# Patient Record
Sex: FEMALE | Race: WHITE | NOT HISPANIC OR LATINO | Employment: FULL TIME | URBAN - METROPOLITAN AREA
[De-identification: names, ages, dates, MRNs, and addresses within clinical notes are randomized per-mention and may not be internally consistent; named-entity substitution may affect disease eponyms.]

---

## 2023-03-27 ENCOUNTER — TELEPHONE (OUTPATIENT)
Dept: PRIMARY CARE | Facility: CLINIC | Age: 58
End: 2023-03-27
Payer: COMMERCIAL

## 2023-03-27 DIAGNOSIS — K21.9 CHRONIC GERD: ICD-10-CM

## 2023-03-27 DIAGNOSIS — I10 HYPERTENSION, UNSPECIFIED TYPE: ICD-10-CM

## 2023-03-27 RX ORDER — OMEPRAZOLE 20 MG/1
20 CAPSULE, DELAYED RELEASE ORAL
Qty: 90 CAPSULE | Refills: 3 | Status: SHIPPED | OUTPATIENT
Start: 2023-03-27 | End: 2023-11-28

## 2023-03-27 RX ORDER — OMEPRAZOLE 20 MG/1
20 CAPSULE, DELAYED RELEASE ORAL
COMMUNITY
Start: 2022-03-08 | End: 2023-03-27 | Stop reason: SDUPTHER

## 2023-03-27 RX ORDER — LOSARTAN POTASSIUM AND HYDROCHLOROTHIAZIDE 12.5; 5 MG/1; MG/1
1 TABLET ORAL DAILY
Qty: 90 TABLET | Refills: 3 | Status: SHIPPED | OUTPATIENT
Start: 2023-03-27 | End: 2023-11-28

## 2023-03-27 RX ORDER — LOSARTAN POTASSIUM AND HYDROCHLOROTHIAZIDE 12.5; 5 MG/1; MG/1
1 TABLET ORAL DAILY
COMMUNITY
Start: 2022-11-02 | End: 2023-03-27 | Stop reason: SDUPTHER

## 2023-05-24 ENCOUNTER — APPOINTMENT (OUTPATIENT)
Dept: PRIMARY CARE | Facility: CLINIC | Age: 58
End: 2023-05-24
Payer: COMMERCIAL

## 2023-06-02 PROBLEM — S52.123A RADIAL HEAD FRACTURE: Status: ACTIVE | Noted: 2023-06-02

## 2023-06-02 PROBLEM — K21.9 GERD (GASTROESOPHAGEAL REFLUX DISEASE): Status: ACTIVE | Noted: 2023-06-02

## 2023-06-02 PROBLEM — R13.10 DYSPHAGIA: Status: ACTIVE | Noted: 2023-06-02

## 2023-06-02 PROBLEM — E66.9 OBESITY: Status: ACTIVE | Noted: 2023-06-02

## 2023-06-02 PROBLEM — J30.2 SEASONAL ALLERGIES: Status: ACTIVE | Noted: 2023-06-02

## 2023-06-02 PROBLEM — I10 PRIMARY HYPERTENSION: Status: ACTIVE | Noted: 2023-06-02

## 2023-06-02 PROBLEM — M62.830 PARASPINAL MUSCLE SPASM: Status: ACTIVE | Noted: 2023-06-02

## 2023-06-02 PROBLEM — M17.10 ARTHRITIS OF KNEE: Status: ACTIVE | Noted: 2023-06-02

## 2023-06-02 PROBLEM — E04.1 THYROID NODULE: Status: ACTIVE | Noted: 2023-06-02

## 2023-06-02 PROBLEM — E55.9 VITAMIN D DEFICIENCY DISEASE: Status: ACTIVE | Noted: 2023-06-02

## 2023-06-02 PROBLEM — R10.30 LOWER ABDOMINAL PAIN: Status: ACTIVE | Noted: 2023-06-02

## 2023-06-03 ASSESSMENT — ENCOUNTER SYMPTOMS
ORTHOPNEA: 0
NECK PAIN: 0
BLURRED VISION: 0
SWEATS: 0
SHORTNESS OF BREATH: 0
PND: 0
HEADACHES: 0
PALPITATIONS: 0
HYPERTENSION: 1

## 2023-06-07 ENCOUNTER — OFFICE VISIT (OUTPATIENT)
Dept: PRIMARY CARE | Facility: CLINIC | Age: 58
End: 2023-06-07
Payer: COMMERCIAL

## 2023-06-07 VITALS
OXYGEN SATURATION: 99 % | TEMPERATURE: 98.4 F | HEIGHT: 66 IN | SYSTOLIC BLOOD PRESSURE: 132 MMHG | HEART RATE: 76 BPM | RESPIRATION RATE: 18 BRPM | BODY MASS INDEX: 36.8 KG/M2 | DIASTOLIC BLOOD PRESSURE: 90 MMHG | WEIGHT: 229 LBS

## 2023-06-07 DIAGNOSIS — E11.9 TYPE 2 DIABETES MELLITUS WITHOUT COMPLICATION, WITHOUT LONG-TERM CURRENT USE OF INSULIN (MULTI): Primary | ICD-10-CM

## 2023-06-07 DIAGNOSIS — E66.01 CLASS 2 SEVERE OBESITY DUE TO EXCESS CALORIES WITH SERIOUS COMORBIDITY AND BODY MASS INDEX (BMI) OF 36.0 TO 36.9 IN ADULT (MULTI): ICD-10-CM

## 2023-06-07 DIAGNOSIS — I10 PRIMARY HYPERTENSION: ICD-10-CM

## 2023-06-07 PROBLEM — R13.10 DYSPHAGIA: Status: RESOLVED | Noted: 2023-06-02 | Resolved: 2023-06-07

## 2023-06-07 PROBLEM — R10.30 LOWER ABDOMINAL PAIN: Status: RESOLVED | Noted: 2023-06-02 | Resolved: 2023-06-07

## 2023-06-07 PROBLEM — S52.123A RADIAL HEAD FRACTURE: Status: RESOLVED | Noted: 2023-06-02 | Resolved: 2023-06-07

## 2023-06-07 PROBLEM — E55.9 VITAMIN D DEFICIENCY DISEASE: Status: RESOLVED | Noted: 2023-06-02 | Resolved: 2023-06-07

## 2023-06-07 PROBLEM — M62.830 PARASPINAL MUSCLE SPASM: Status: RESOLVED | Noted: 2023-06-02 | Resolved: 2023-06-07

## 2023-06-07 PROBLEM — E04.1 THYROID NODULE: Status: RESOLVED | Noted: 2023-06-02 | Resolved: 2023-06-07

## 2023-06-07 LAB — POC HEMOGLOBIN A1C: 6.6 % (ref 4.2–6.5)

## 2023-06-07 PROCEDURE — 3075F SYST BP GE 130 - 139MM HG: CPT | Performed by: INTERNAL MEDICINE

## 2023-06-07 PROCEDURE — 1036F TOBACCO NON-USER: CPT | Performed by: INTERNAL MEDICINE

## 2023-06-07 PROCEDURE — 3080F DIAST BP >= 90 MM HG: CPT | Performed by: INTERNAL MEDICINE

## 2023-06-07 PROCEDURE — 99214 OFFICE O/P EST MOD 30 MIN: CPT | Performed by: INTERNAL MEDICINE

## 2023-06-07 PROCEDURE — 3008F BODY MASS INDEX DOCD: CPT | Performed by: INTERNAL MEDICINE

## 2023-06-07 PROCEDURE — 83036 HEMOGLOBIN GLYCOSYLATED A1C: CPT | Performed by: INTERNAL MEDICINE

## 2023-06-07 ASSESSMENT — ENCOUNTER SYMPTOMS
ORTHOPNEA: 0
SWEATS: 0
PND: 0
BLURRED VISION: 0
HYPERTENSION: 1
NECK PAIN: 0
HEADACHES: 0
PALPITATIONS: 0
SHORTNESS OF BREATH: 0

## 2023-06-07 NOTE — PROGRESS NOTES
"Sabino Haywood is a 58 y.o. female who presents for Hypertension.  Hypertension  This is a chronic problem. The current episode started more than 1 year ago. The problem has been rapidly improving since onset. The problem is controlled. Pertinent negatives include no anxiety, blurred vision, chest pain, headaches, malaise/fatigue, neck pain, orthopnea, palpitations, peripheral edema, PND, shortness of breath or sweats. Agents associated with hypertension include NSAIDs. Risk factors for coronary artery disease include family history, obesity and post-menopausal state. There are no compliance problems.    She is doing well. The elbow  is healing up well.   No chest pain or leg swelling.   Her blood sugar was a little high last time but it was not fasting.   She  has a family of diabetes.  Mother is type 2    Review of Systems   Constitutional:  Negative for malaise/fatigue.   Eyes:  Negative for blurred vision.   Respiratory:  Negative for shortness of breath.    Cardiovascular:  Negative for chest pain, palpitations, orthopnea and PND.   Musculoskeletal:  Negative for neck pain.   Neurological:  Negative for headaches.   All other systems reviewed and are negative.        Objective     /90   Pulse 76   Temp 36.9 °C (98.4 °F)   Resp 18   Ht 1.676 m (5' 6\")   Wt 104 kg (229 lb)   SpO2 99%   BMI 36.96 kg/m²    Physical Exam  Vitals reviewed.   Constitutional:       General: She is not in acute distress.     Appearance: Normal appearance.   Cardiovascular:      Rate and Rhythm: Normal rate and regular rhythm.      Pulses: Normal pulses.      Heart sounds: Normal heart sounds.   Pulmonary:      Effort: Pulmonary effort is normal.      Breath sounds: Normal breath sounds.   Abdominal:      Tenderness: There is no abdominal tenderness.   Musculoskeletal:         General: No swelling.   Skin:     General: Skin is warm and dry.   Neurological:      Mental Status: She is alert.       Health " Maintenance Due   Topic Date Due    Yearly Adult Physical  Never done    HIV Screening  Never done    MMR Vaccines (1 of 1 - Standard series) Never done    HIB Vaccines (1 of 1 - Risk 1-dose series) Never done    Meningococcal Vaccine (1 - Risk 2-dose series) Never done    Meningococcal B Vaccine (1 of 4 - Increased Risk Bexsero 2-dose series) Never done    Hepatitis C Screening  Never done    Diabetes Screening  Never done    Zoster Vaccines (1 of 2) Never done    Cervical Cancer Screening  Never done    Pneumococcal Vaccine: Pediatrics (0 to 5 Years) and At-Risk Patients (6 to 64 Years) (2 - PPSV23 if available, else PCV20) 09/20/2019    COVID-19 Vaccine (5 - Booster) 10/21/2022          Assessment/Plan   Problem List Items Addressed This Visit          Circulatory    Primary hypertension       Endocrine/Metabolic    Obesity     Other Visit Diagnoses       Type 2 diabetes mellitus without complication, without long-term current use of insulin (CMS/formerly [facility])    -  Primary    Relevant Medications    semaglutide 0.25 mg or 0.5 mg (2 mg/3 mL) pen injector    Other Relevant Orders    POCT glycosylated hemoglobin (Hb A1C) manually resulted (Completed)        Stable based on symptoms. Continue established treatment plan and follow up at least yearly

## 2023-06-07 NOTE — PATIENT INSTRUCTIONS
Call  with any problems or questions.   Discussed risk benefit and side effects of glp  Follow up in 3 months.

## 2023-09-11 ENCOUNTER — APPOINTMENT (OUTPATIENT)
Dept: PRIMARY CARE | Facility: CLINIC | Age: 58
End: 2023-09-11
Payer: COMMERCIAL

## 2023-09-14 ENCOUNTER — OFFICE VISIT (OUTPATIENT)
Dept: PRIMARY CARE | Facility: CLINIC | Age: 58
End: 2023-09-14
Payer: COMMERCIAL

## 2023-09-14 VITALS
DIASTOLIC BLOOD PRESSURE: 82 MMHG | RESPIRATION RATE: 14 BRPM | WEIGHT: 204 LBS | TEMPERATURE: 97 F | OXYGEN SATURATION: 96 % | HEART RATE: 78 BPM | SYSTOLIC BLOOD PRESSURE: 120 MMHG | HEIGHT: 66 IN | BODY MASS INDEX: 32.78 KG/M2

## 2023-09-14 DIAGNOSIS — Z00.00 WELLNESS EXAMINATION: Primary | ICD-10-CM

## 2023-09-14 DIAGNOSIS — Z23 ENCOUNTER FOR ADMINISTRATION OF VACCINE: ICD-10-CM

## 2023-09-14 DIAGNOSIS — I10 PRIMARY HYPERTENSION: ICD-10-CM

## 2023-09-14 DIAGNOSIS — Z90.81 ACQUIRED ASPLENIA: ICD-10-CM

## 2023-09-14 DIAGNOSIS — Z12.31 ENCOUNTER FOR SCREENING MAMMOGRAM FOR MALIGNANT NEOPLASM OF BREAST: ICD-10-CM

## 2023-09-14 DIAGNOSIS — E11.9 TYPE 2 DIABETES MELLITUS WITHOUT COMPLICATION, WITHOUT LONG-TERM CURRENT USE OF INSULIN (MULTI): ICD-10-CM

## 2023-09-14 LAB — POC HEMOGLOBIN A1C: 6.1 % (ref 4.2–6.5)

## 2023-09-14 PROCEDURE — 90471 IMMUNIZATION ADMIN: CPT | Performed by: INTERNAL MEDICINE

## 2023-09-14 PROCEDURE — 3074F SYST BP LT 130 MM HG: CPT | Performed by: INTERNAL MEDICINE

## 2023-09-14 PROCEDURE — 1036F TOBACCO NON-USER: CPT | Performed by: INTERNAL MEDICINE

## 2023-09-14 PROCEDURE — 3079F DIAST BP 80-89 MM HG: CPT | Performed by: INTERNAL MEDICINE

## 2023-09-14 PROCEDURE — 3008F BODY MASS INDEX DOCD: CPT | Performed by: INTERNAL MEDICINE

## 2023-09-14 PROCEDURE — 99396 PREV VISIT EST AGE 40-64: CPT | Performed by: INTERNAL MEDICINE

## 2023-09-14 PROCEDURE — 90686 IIV4 VACC NO PRSV 0.5 ML IM: CPT | Performed by: INTERNAL MEDICINE

## 2023-09-14 PROCEDURE — 83036 HEMOGLOBIN GLYCOSYLATED A1C: CPT | Performed by: INTERNAL MEDICINE

## 2023-09-14 RX ORDER — CHOLECALCIFEROL (VITAMIN D3) 25 MCG
50 TABLET ORAL DAILY
COMMUNITY
Start: 2022-03-08

## 2023-09-14 NOTE — PROGRESS NOTES
"Subjective    Yudelka Haywood is a 58 y.o. female who presents for Hypertension.  HPI  3 month follow up HTN and physical  She is feeling good.   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 She has lost 25 lbs. Portion control less carb and more protein. And walking.   Bp has been good at home.   Denies headaches, dizziness, chest pain  Would like flu vaccine.       Review of Systems   All other systems reviewed and are negative.        Objective     /82 (BP Location: Left arm, Patient Position: Sitting, BP Cuff Size: Adult)   Pulse 78   Temp 36.1 °C (97 °F) (Temporal)   Resp 14   Ht 1.676 m (5' 6\")   Wt 92.5 kg (204 lb)   SpO2 96%   BMI 32.93 kg/m²    Physical Exam  Constitutional:       Appearance: Normal appearance.   Cardiovascular:      Rate and Rhythm: Normal rate and regular rhythm.      Pulses: Normal pulses.   Pulmonary:      Effort: Pulmonary effort is normal.      Breath sounds: Normal breath sounds.   Chest:      Chest wall: No mass or tenderness.   Breasts:     Right: Normal.      Left: Normal.   Abdominal:      General: Abdomen is flat.   Musculoskeletal:      Cervical back: Neck supple. No tenderness.   Lymphadenopathy:      Cervical: No cervical adenopathy.      Upper Body:      Right upper body: No supraclavicular or axillary adenopathy.      Left upper body: No supraclavicular or axillary adenopathy.   Neurological:      Mental Status: She is alert.   Psychiatric:         Attention and Perception: Attention and perception normal.         Mood and Affect: Mood and affect normal.       Health Maintenance Due   Topic Date Due    HIV Screening  Never done    HIB Vaccines (1 of 1 - Risk 1-dose series) Never done    Meningococcal Vaccine (1 - Risk 2-dose series) Never done    Diabetes: Foot Exam  Never done    Diabetes: Retinopathy Screening  Never done    Meningococcal B Vaccine (1 of 4 - Increased Risk) Never done    Hepatitis C " Screening  Never done    Diabetes: Urine Protein Screening  Never done    Cervical Cancer Screening  Never done    MMR Vaccines (1 of 1 - Standard series) Never done    Pneumococcal Vaccine: Pediatrics (0 to 5 Years) and At-Risk Patients (6 to 64 Years) (2 - PPSV23 or PCV20) 09/20/2019    COVID-19 Vaccine (5 - Moderna series) 01/16/2023    Lipid Panel  03/17/2023          Assessment/Plan   Problem List Items Addressed This Visit       Primary hypertension    Type 2 diabetes mellitus without complication, without long-term current use of insulin (CMS/[facility])    Relevant Orders    POCT glycosylated hemoglobin (Hb A1C) manually resulted (Completed)     Other Visit Diagnoses       Wellness examination    -  Primary    Relevant Orders    CBC    Comprehensive Metabolic Panel    Lipid Panel    Encounter for administration of vaccine        Relevant Orders    Flu vaccine (IIV4) age 6 months and greater, preservative free (Completed)    Encounter for screening mammogram for malignant neoplasm of breast        Relevant Orders    BI mammo bilateral screening tomosynthesis    Acquired asplenia        she  is not up to date with her vaccines.   will look up her old records        Patient was identified as a fall risk. Risk prevention instructions provided.  Check labs mamm  Cont with dietary changes.   Recheck hg A1c in three

## 2023-09-18 ENCOUNTER — LAB (OUTPATIENT)
Dept: LAB | Facility: LAB | Age: 58
End: 2023-09-18
Payer: COMMERCIAL

## 2023-09-18 DIAGNOSIS — Z00.00 WELLNESS EXAMINATION: ICD-10-CM

## 2023-09-18 LAB
ALANINE AMINOTRANSFERASE (SGPT) (U/L) IN SER/PLAS: 17 U/L (ref 7–45)
ALBUMIN (G/DL) IN SER/PLAS: 4.1 G/DL (ref 3.4–5)
ALKALINE PHOSPHATASE (U/L) IN SER/PLAS: 70 U/L (ref 33–136)
ANION GAP IN SER/PLAS: 13 MMOL/L (ref 10–20)
ASPARTATE AMINOTRANSFERASE (SGOT) (U/L) IN SER/PLAS: 16 U/L (ref 9–39)
BILIRUBIN TOTAL (MG/DL) IN SER/PLAS: 0.6 MG/DL (ref 0–1.2)
CALCIUM (MG/DL) IN SER/PLAS: 9.6 MG/DL (ref 8.6–10.3)
CARBON DIOXIDE, TOTAL (MMOL/L) IN SER/PLAS: 28 MMOL/L (ref 21–32)
CHLORIDE (MMOL/L) IN SER/PLAS: 102 MMOL/L (ref 98–107)
CHOLESTEROL (MG/DL) IN SER/PLAS: 171 MG/DL (ref 0–199)
CHOLESTEROL IN HDL (MG/DL) IN SER/PLAS: 58.7 MG/DL
CHOLESTEROL/HDL RATIO: 2.9
CREATININE (MG/DL) IN SER/PLAS: 0.66 MG/DL (ref 0.5–1.05)
ERYTHROCYTE DISTRIBUTION WIDTH (RATIO) BY AUTOMATED COUNT: 13.8 % (ref 11.5–14.5)
ERYTHROCYTE MEAN CORPUSCULAR HEMOGLOBIN CONCENTRATION (G/DL) BY AUTOMATED: 32.4 G/DL (ref 32–36)
ERYTHROCYTE MEAN CORPUSCULAR VOLUME (FL) BY AUTOMATED COUNT: 89 FL (ref 80–100)
ERYTHROCYTES (10*6/UL) IN BLOOD BY AUTOMATED COUNT: 4.73 X10E12/L (ref 4–5.2)
GFR FEMALE: >90 ML/MIN/1.73M2
GLUCOSE (MG/DL) IN SER/PLAS: 98 MG/DL (ref 74–99)
HEMATOCRIT (%) IN BLOOD BY AUTOMATED COUNT: 42 % (ref 36–46)
HEMOGLOBIN (G/DL) IN BLOOD: 13.6 G/DL (ref 12–16)
LDL: 95 MG/DL (ref 0–99)
LEUKOCYTES (10*3/UL) IN BLOOD BY AUTOMATED COUNT: 7.2 X10E9/L (ref 4.4–11.3)
PLATELETS (10*3/UL) IN BLOOD AUTOMATED COUNT: 307 X10E9/L (ref 150–450)
POTASSIUM (MMOL/L) IN SER/PLAS: 3.8 MMOL/L (ref 3.5–5.3)
PROTEIN TOTAL: 7.2 G/DL (ref 6.4–8.2)
SODIUM (MMOL/L) IN SER/PLAS: 139 MMOL/L (ref 136–145)
TRIGLYCERIDE (MG/DL) IN SER/PLAS: 88 MG/DL (ref 0–149)
UREA NITROGEN (MG/DL) IN SER/PLAS: 11 MG/DL (ref 6–23)
VLDL: 18 MG/DL (ref 0–40)

## 2023-09-18 PROCEDURE — 85027 COMPLETE CBC AUTOMATED: CPT

## 2023-09-18 PROCEDURE — 80053 COMPREHEN METABOLIC PANEL: CPT

## 2023-09-18 PROCEDURE — 36415 COLL VENOUS BLD VENIPUNCTURE: CPT

## 2023-09-18 PROCEDURE — 80061 LIPID PANEL: CPT

## 2023-11-28 DIAGNOSIS — I10 HYPERTENSION, UNSPECIFIED TYPE: ICD-10-CM

## 2023-11-28 DIAGNOSIS — K21.9 CHRONIC GERD: ICD-10-CM

## 2023-11-28 RX ORDER — OMEPRAZOLE 20 MG/1
20 CAPSULE, DELAYED RELEASE ORAL
Qty: 90 CAPSULE | Refills: 3 | Status: SHIPPED | OUTPATIENT
Start: 2023-11-28 | End: 2024-01-23 | Stop reason: SDUPTHER

## 2023-11-28 RX ORDER — LOSARTAN POTASSIUM AND HYDROCHLOROTHIAZIDE 12.5; 5 MG/1; MG/1
1 TABLET ORAL DAILY
Qty: 90 TABLET | Refills: 3 | Status: SHIPPED | OUTPATIENT
Start: 2023-11-28 | End: 2024-01-23 | Stop reason: SDUPTHER

## 2023-12-11 ENCOUNTER — ANCILLARY PROCEDURE (OUTPATIENT)
Dept: RADIOLOGY | Facility: CLINIC | Age: 58
End: 2023-12-11
Payer: COMMERCIAL

## 2023-12-11 DIAGNOSIS — Z12.31 ENCOUNTER FOR SCREENING MAMMOGRAM FOR MALIGNANT NEOPLASM OF BREAST: ICD-10-CM

## 2023-12-11 DIAGNOSIS — E11.9 TYPE 2 DIABETES MELLITUS WITHOUT COMPLICATION, WITHOUT LONG-TERM CURRENT USE OF INSULIN (MULTI): Primary | ICD-10-CM

## 2023-12-11 PROCEDURE — 77067 SCR MAMMO BI INCL CAD: CPT | Mod: BILATERAL PROCEDURE | Performed by: RADIOLOGY

## 2023-12-11 PROCEDURE — 77063 BREAST TOMOSYNTHESIS BI: CPT

## 2023-12-11 PROCEDURE — 77063 BREAST TOMOSYNTHESIS BI: CPT | Mod: BILATERAL PROCEDURE | Performed by: RADIOLOGY

## 2023-12-13 ENCOUNTER — LAB (OUTPATIENT)
Dept: LAB | Facility: LAB | Age: 58
End: 2023-12-13
Payer: COMMERCIAL

## 2023-12-13 DIAGNOSIS — E11.9 TYPE 2 DIABETES MELLITUS WITHOUT COMPLICATION, WITHOUT LONG-TERM CURRENT USE OF INSULIN (MULTI): ICD-10-CM

## 2023-12-13 LAB
EST. AVERAGE GLUCOSE BLD GHB EST-MCNC: 128 MG/DL
HBA1C MFR BLD: 6.1 %

## 2023-12-13 PROCEDURE — 83036 HEMOGLOBIN GLYCOSYLATED A1C: CPT

## 2023-12-13 PROCEDURE — 36415 COLL VENOUS BLD VENIPUNCTURE: CPT

## 2024-01-23 DIAGNOSIS — K21.9 CHRONIC GERD: ICD-10-CM

## 2024-01-23 DIAGNOSIS — I10 HYPERTENSION, UNSPECIFIED TYPE: ICD-10-CM

## 2024-01-23 RX ORDER — LOSARTAN POTASSIUM AND HYDROCHLOROTHIAZIDE 12.5; 5 MG/1; MG/1
1 TABLET ORAL DAILY
Qty: 90 TABLET | Refills: 0 | Status: SHIPPED | OUTPATIENT
Start: 2024-01-23 | End: 2024-05-28 | Stop reason: SDUPTHER

## 2024-01-23 RX ORDER — OMEPRAZOLE 20 MG/1
20 CAPSULE, DELAYED RELEASE ORAL
Qty: 90 CAPSULE | Refills: 0 | Status: SHIPPED | OUTPATIENT
Start: 2024-01-23 | End: 2024-05-28 | Stop reason: SDUPTHER

## 2024-01-23 NOTE — TELEPHONE ENCOUNTER
----- Message from Yudelka Haywood sent at 1/22/2024  7:17 PM EST -----  Regarding: Short term prescription   Contact: [phone]  [place]! My  and I traveled to [place] to help my mom for a weekend but he had a cardiac event and we will be here longer. I brought a few extra meds but not enough till he’s cleared to fly (estimate is one week). Are you able to call in a prescription for my BP med and my omeprazole to the [facility] in [place]? The phone number is [phone]. Address is [address] [place] [address] [address] . [place] [phone]. I am concerned if insurance will be a problem if they aren’t due for refill yet but hoping we can work through that. I can be reached at [phone]. Thank you.

## 2024-03-21 ENCOUNTER — OFFICE VISIT (OUTPATIENT)
Dept: PRIMARY CARE | Facility: CLINIC | Age: 59
End: 2024-03-21
Payer: COMMERCIAL

## 2024-03-21 VITALS
WEIGHT: 189 LBS | HEART RATE: 76 BPM | HEIGHT: 66 IN | TEMPERATURE: 97.3 F | OXYGEN SATURATION: 96 % | DIASTOLIC BLOOD PRESSURE: 80 MMHG | SYSTOLIC BLOOD PRESSURE: 132 MMHG | BODY MASS INDEX: 30.37 KG/M2 | RESPIRATION RATE: 14 BRPM

## 2024-03-21 DIAGNOSIS — E11.9 TYPE 2 DIABETES MELLITUS WITHOUT COMPLICATION, WITHOUT LONG-TERM CURRENT USE OF INSULIN (MULTI): Primary | ICD-10-CM

## 2024-03-21 DIAGNOSIS — I10 PRIMARY HYPERTENSION: ICD-10-CM

## 2024-03-21 PROBLEM — E66.9 OBESITY: Status: RESOLVED | Noted: 2023-06-02 | Resolved: 2024-03-21

## 2024-03-21 LAB — POC HEMOGLOBIN A1C: 6.3 % (ref 4.2–6.5)

## 2024-03-21 PROCEDURE — 3075F SYST BP GE 130 - 139MM HG: CPT | Performed by: INTERNAL MEDICINE

## 2024-03-21 PROCEDURE — 3008F BODY MASS INDEX DOCD: CPT | Performed by: INTERNAL MEDICINE

## 2024-03-21 PROCEDURE — 3079F DIAST BP 80-89 MM HG: CPT | Performed by: INTERNAL MEDICINE

## 2024-03-21 PROCEDURE — 1036F TOBACCO NON-USER: CPT | Performed by: INTERNAL MEDICINE

## 2024-03-21 PROCEDURE — 83036 HEMOGLOBIN GLYCOSYLATED A1C: CPT | Performed by: INTERNAL MEDICINE

## 2024-03-21 PROCEDURE — 99214 OFFICE O/P EST MOD 30 MIN: CPT | Performed by: INTERNAL MEDICINE

## 2024-03-21 ASSESSMENT — PATIENT HEALTH QUESTIONNAIRE - PHQ9
SUM OF ALL RESPONSES TO PHQ9 QUESTIONS 1 AND 2: 0
2. FEELING DOWN, DEPRESSED OR HOPELESS: NOT AT ALL
1. LITTLE INTEREST OR PLEASURE IN DOING THINGS: NOT AT ALL

## 2024-03-21 NOTE — PROGRESS NOTES
"poctSubjective    Yudelka Haywood is a 59 y.o. female who presents for Hypertension.  HPI  6 month follow up  Doing well  Last A1c 6.1.   Some stress at home.   She has lost 40# with diet. Elevated proteins less carbs.   She has been walking a lot . Increased fluids  She has had a lot of vacations she has had a lot of stress  and her diet has been different    Review of Systems   All other systems reviewed and are negative.        Objective     /80 (BP Location: Left arm, Patient Position: Sitting, BP Cuff Size: Adult)   Pulse 76   Temp 36.3 °C (97.3 °F) (Skin)   Resp 14   Ht 1.676 m (5' 6\")   Wt 85.7 kg (189 lb)   SpO2 96%   BMI 30.51 kg/m²    Physical Exam  Vitals reviewed.   Constitutional:       General: She is not in acute distress.     Appearance: Normal appearance.   Cardiovascular:      Rate and Rhythm: Normal rate and regular rhythm.      Pulses: Normal pulses.      Heart sounds: Normal heart sounds.   Pulmonary:      Effort: Pulmonary effort is normal.      Breath sounds: Normal breath sounds.   Abdominal:      Tenderness: There is no abdominal tenderness.   Musculoskeletal:         General: No swelling.   Skin:     General: Skin is warm and dry.   Neurological:      Mental Status: She is alert.       Health Maintenance Due   Topic Date Due    HIV Screening  Never done    HIB Vaccines (1 of 1 - Risk 1-dose series) Never done    Meningococcal Vaccine (1 - Risk 2-dose series) Never done    Diabetes: Foot Exam  Never done    Diabetes: Retinopathy Screening  Never done    Meningococcal B Vaccine (1 of 4 - Increased Risk) Never done    Hepatitis C Screening  Never done    Diabetes: Urine Protein Screening  Never done    MMR Vaccines (1 of 1 - Standard series) Never done    Pneumococcal Vaccine: Pediatrics (0 to 5 Years) and At-Risk Patients (6 to 64 Years) (2 - PPSV23 or PCV20) 09/20/2019    Cervical Cancer Screening  06/29/2020          Assessment/Plan   Problem List Items Addressed This Visit       " Primary hypertension    Type 2 diabetes mellitus without complication, without long-term current use of insulin (CMS/[facility]) - Primary    Relevant Orders    POCT glycosylated hemoglobin (Hb A1C) manually resulted (Completed)   Her blood sugars are good.   Her bp is well controlled.   We weight is down.   Cont with dietary changes.  Watch sugars and processed carbs  Call  with any problems or questions.   Follow up  in 6 months.

## 2024-05-07 DIAGNOSIS — M54.9 BACK PAIN, UNSPECIFIED BACK LOCATION, UNSPECIFIED BACK PAIN LATERALITY, UNSPECIFIED CHRONICITY: Primary | ICD-10-CM

## 2024-05-07 RX ORDER — METHYLPREDNISOLONE 4 MG/1
TABLET ORAL
Qty: 21 TABLET | Refills: 0 | Status: SHIPPED | OUTPATIENT
Start: 2024-05-07 | End: 2024-05-14

## 2024-05-07 RX ORDER — CYCLOBENZAPRINE HCL 10 MG
10 TABLET ORAL NIGHTLY PRN
Qty: 30 TABLET | Refills: 0 | Status: SHIPPED | OUTPATIENT
Start: 2024-05-07 | End: 2024-07-06

## 2024-05-28 DIAGNOSIS — I10 HYPERTENSION, UNSPECIFIED TYPE: ICD-10-CM

## 2024-05-28 DIAGNOSIS — K21.9 CHRONIC GERD: ICD-10-CM

## 2024-05-28 RX ORDER — OMEPRAZOLE 20 MG/1
20 CAPSULE, DELAYED RELEASE ORAL
Qty: 90 CAPSULE | Refills: 3 | Status: SHIPPED | OUTPATIENT
Start: 2024-05-28

## 2024-05-28 RX ORDER — LOSARTAN POTASSIUM AND HYDROCHLOROTHIAZIDE 12.5; 5 MG/1; MG/1
1 TABLET ORAL DAILY
Qty: 90 TABLET | Refills: 3 | Status: SHIPPED | OUTPATIENT
Start: 2024-05-28

## 2024-05-28 NOTE — TELEPHONE ENCOUNTER
----- Message from Yudelka Haywood sent at 5/27/2024  9:56 PM EDT -----  Regarding: Rx refills through [facility]   Contact: [phone]  Hello...can you please contact [facility] mail order pharmacy for new prescriptions for my omeprazole and losartan/hctz. I believe the number is [phone]. Thank you.

## 2024-09-18 ASSESSMENT — PROMIS GLOBAL HEALTH SCALE
RATE_SOCIAL_SATISFACTION: VERY GOOD
RATE_AVERAGE_PAIN: 1
RATE_QUALITY_OF_LIFE: VERY GOOD
RATE_PHYSICAL_HEALTH: GOOD
CARRYOUT_PHYSICAL_ACTIVITIES: COMPLETELY
CARRYOUT_SOCIAL_ACTIVITIES: EXCELLENT
RATE_AVERAGE_FATIGUE: MILD
EMOTIONAL_PROBLEMS: RARELY
RATE_GENERAL_HEALTH: VERY GOOD
RATE_MENTAL_HEALTH: VERY GOOD

## 2024-09-25 ENCOUNTER — APPOINTMENT (OUTPATIENT)
Dept: PRIMARY CARE | Facility: CLINIC | Age: 59
End: 2024-09-25
Payer: COMMERCIAL

## 2024-09-25 VITALS
HEIGHT: 66 IN | TEMPERATURE: 97.6 F | SYSTOLIC BLOOD PRESSURE: 132 MMHG | BODY MASS INDEX: 31.66 KG/M2 | DIASTOLIC BLOOD PRESSURE: 80 MMHG | WEIGHT: 197 LBS | RESPIRATION RATE: 18 BRPM | HEART RATE: 68 BPM | OXYGEN SATURATION: 98 %

## 2024-09-25 DIAGNOSIS — R73.9 HYPERGLYCEMIA: ICD-10-CM

## 2024-09-25 DIAGNOSIS — Z12.31 ENCOUNTER FOR SCREENING MAMMOGRAM FOR MALIGNANT NEOPLASM OF BREAST: ICD-10-CM

## 2024-09-25 DIAGNOSIS — Z00.00 WELLNESS EXAMINATION: Primary | ICD-10-CM

## 2024-09-25 DIAGNOSIS — Z23 PNEUMOCOCCAL VACCINATION ADMINISTERED AT CURRENT VISIT: ICD-10-CM

## 2024-09-25 DIAGNOSIS — Z90.81 ACQUIRED ASPLENIA: ICD-10-CM

## 2024-09-25 PROBLEM — E11.9 TYPE 2 DIABETES MELLITUS WITHOUT COMPLICATION, WITHOUT LONG-TERM CURRENT USE OF INSULIN (MULTI): Status: RESOLVED | Noted: 2023-09-14 | Resolved: 2024-09-25

## 2024-09-25 LAB — POC HEMOGLOBIN A1C: 6.2 % (ref 4.2–6.5)

## 2024-09-25 PROCEDURE — 1036F TOBACCO NON-USER: CPT | Performed by: INTERNAL MEDICINE

## 2024-09-25 PROCEDURE — 99396 PREV VISIT EST AGE 40-64: CPT | Performed by: INTERNAL MEDICINE

## 2024-09-25 PROCEDURE — 83036 HEMOGLOBIN GLYCOSYLATED A1C: CPT | Performed by: INTERNAL MEDICINE

## 2024-09-25 PROCEDURE — 3075F SYST BP GE 130 - 139MM HG: CPT | Performed by: INTERNAL MEDICINE

## 2024-09-25 PROCEDURE — 90471 IMMUNIZATION ADMIN: CPT | Performed by: INTERNAL MEDICINE

## 2024-09-25 PROCEDURE — 90677 PCV20 VACCINE IM: CPT | Performed by: INTERNAL MEDICINE

## 2024-09-25 PROCEDURE — 3008F BODY MASS INDEX DOCD: CPT | Performed by: INTERNAL MEDICINE

## 2024-09-25 PROCEDURE — 3079F DIAST BP 80-89 MM HG: CPT | Performed by: INTERNAL MEDICINE

## 2024-09-25 NOTE — PROGRESS NOTES
"Subjective   Patient ID: Yudelka Haywood is a 59 y.o. female who presents for Annual Exam and Diabetes.    Patient reported health Good  Regular Dental Visits yes  Regular Eye Visits yes  Hearing Loss no  Balanced Diet yes  Weight Concerns yes  Exercise Regular  She is going to [place] next week.  Going to [place]  Had her vaccines done flu, rsv, and covid  Granddaughter turned 3 months. Junior Perez. She is babysitting two days a week.  She has changed her diet.  Has lost weight  She works two days aga week   She has not received  a booster for pneumonia or meningococcus vaccine due to asplenia  Sp hyster  Repeat colonoscopy in 2027        Diabetes  She presents for her follow-up diabetic visit. She has type 2 diabetes mellitus. There are no hypoglycemic associated symptoms. Symptoms are stable.        Review of Systems   All other systems reviewed and are negative.      Objective   /80   Pulse 68   Temp 36.4 °C (97.6 °F)   Resp 18   Ht 1.676 m (5' 6\")   Wt 89.4 kg (197 lb)   SpO2 98%   BMI 31.80 kg/m²     Physical Exam  Constitutional:       Appearance: Normal appearance.   Cardiovascular:      Rate and Rhythm: Normal rate and regular rhythm.      Pulses: Normal pulses.   Pulmonary:      Effort: Pulmonary effort is normal.      Breath sounds: Normal breath sounds.   Chest:      Chest wall: No mass or tenderness.   Breasts:     Right: Normal.      Left: Normal.   Abdominal:      General: Abdomen is flat.   Musculoskeletal:      Cervical back: Neck supple. No tenderness.   Lymphadenopathy:      Cervical: No cervical adenopathy.      Upper Body:      Right upper body: No supraclavicular or axillary adenopathy.      Left upper body: No supraclavicular or axillary adenopathy.   Neurological:      Mental Status: She is alert.   Psychiatric:         Attention and Perception: Attention and perception normal.         Mood and Affect: Mood and affect normal.         Assessment/Plan   Problem List Items Addressed " This Visit             ICD-10-CM    RESOLVED: Type 2 diabetes mellitus without complication, without long-term current use of insulin (Multi) E11.9     Other Visit Diagnoses         Codes    Wellness examination    -  Primary Z00.00    Relevant Orders    CBC    Comprehensive Metabolic Panel    Lipid Panel    Pneumococcal vaccination administered at current visit     Z23    Relevant Orders    Pneumococcal conjugate vaccine, 20-valent (PREVNAR 20) (Completed)    Acquired asplenia     Z90.81    Encounter for screening mammogram for malignant neoplasm of breast     Z12.31    Relevant Orders    BI mammo bilateral screening tomosynthesis          Will remove the dx of Dm she has mild hyperglycemia. Improved since weight loss  Needs pneumonia shot every 5 years  Call  with any problems or questions.   Follow up in a 6 months.

## 2024-09-26 ENCOUNTER — LAB (OUTPATIENT)
Dept: LAB | Facility: LAB | Age: 59
End: 2024-09-26
Payer: COMMERCIAL

## 2024-09-26 DIAGNOSIS — Z00.00 WELLNESS EXAMINATION: ICD-10-CM

## 2024-09-26 DIAGNOSIS — R73.9 HYPERGLYCEMIA: ICD-10-CM

## 2024-09-26 LAB
ALBUMIN SERPL BCP-MCNC: 4.1 G/DL (ref 3.4–5)
ALP SERPL-CCNC: 86 U/L (ref 33–136)
ALT SERPL W P-5'-P-CCNC: 12 U/L (ref 7–45)
ANION GAP SERPL CALC-SCNC: 11 MMOL/L (ref 10–20)
AST SERPL W P-5'-P-CCNC: 12 U/L (ref 9–39)
BILIRUB SERPL-MCNC: 0.8 MG/DL (ref 0–1.2)
BUN SERPL-MCNC: 13 MG/DL (ref 6–23)
CALCIUM SERPL-MCNC: 9.4 MG/DL (ref 8.6–10.3)
CHLORIDE SERPL-SCNC: 102 MMOL/L (ref 98–107)
CHOLEST SERPL-MCNC: 175 MG/DL (ref 0–199)
CHOLESTEROL/HDL RATIO: 2.7
CO2 SERPL-SCNC: 30 MMOL/L (ref 21–32)
CREAT SERPL-MCNC: 0.69 MG/DL (ref 0.5–1.05)
EGFRCR SERPLBLD CKD-EPI 2021: >90 ML/MIN/1.73M*2
ERYTHROCYTE [DISTWIDTH] IN BLOOD BY AUTOMATED COUNT: 13.7 % (ref 11.5–14.5)
GLUCOSE SERPL-MCNC: 93 MG/DL (ref 74–99)
HCT VFR BLD AUTO: 42.4 % (ref 36–46)
HDLC SERPL-MCNC: 65.2 MG/DL
HGB BLD-MCNC: 13.6 G/DL (ref 12–16)
LDLC SERPL CALC-MCNC: 92 MG/DL
MCH RBC QN AUTO: 28.6 PG (ref 26–34)
MCHC RBC AUTO-ENTMCNC: 32.1 G/DL (ref 32–36)
MCV RBC AUTO: 89 FL (ref 80–100)
NON HDL CHOLESTEROL: 110 MG/DL (ref 0–149)
NRBC BLD-RTO: 0 /100 WBCS (ref 0–0)
PLATELET # BLD AUTO: 329 X10*3/UL (ref 150–450)
POTASSIUM SERPL-SCNC: 4.3 MMOL/L (ref 3.5–5.3)
PROT SERPL-MCNC: 6.9 G/DL (ref 6.4–8.2)
RBC # BLD AUTO: 4.76 X10*6/UL (ref 4–5.2)
SODIUM SERPL-SCNC: 139 MMOL/L (ref 136–145)
TRIGL SERPL-MCNC: 87 MG/DL (ref 0–149)
TSH SERPL-ACNC: 1.49 MIU/L (ref 0.44–3.98)
VLDL: 17 MG/DL (ref 0–40)
WBC # BLD AUTO: 9.8 X10*3/UL (ref 4.4–11.3)

## 2024-09-26 PROCEDURE — 85027 COMPLETE CBC AUTOMATED: CPT

## 2024-09-26 PROCEDURE — 84443 ASSAY THYROID STIM HORMONE: CPT

## 2024-09-26 PROCEDURE — 80053 COMPREHEN METABOLIC PANEL: CPT

## 2024-09-26 PROCEDURE — 80061 LIPID PANEL: CPT

## 2024-09-26 PROCEDURE — 36415 COLL VENOUS BLD VENIPUNCTURE: CPT

## 2024-10-09 PROBLEM — R73.9 HYPERGLYCEMIA: Status: RESOLVED | Noted: 2023-09-14 | Resolved: 2024-09-25

## 2024-12-09 ENCOUNTER — TELEPHONE (OUTPATIENT)
Dept: PRIMARY CARE | Facility: CLINIC | Age: 59
End: 2024-12-09
Payer: COMMERCIAL

## 2024-12-09 NOTE — TELEPHONE ENCOUNTER
Health department needs a diagnosis code with a physicians note for HIB vaccine. She can . Please advise

## 2024-12-18 ENCOUNTER — HOSPITAL ENCOUNTER (OUTPATIENT)
Dept: RADIOLOGY | Facility: CLINIC | Age: 59
Discharge: HOME | End: 2024-12-18
Payer: COMMERCIAL

## 2024-12-18 VITALS — BODY MASS INDEX: 32.14 KG/M2 | WEIGHT: 200 LBS | HEIGHT: 66 IN

## 2024-12-18 DIAGNOSIS — Z12.31 ENCOUNTER FOR SCREENING MAMMOGRAM FOR MALIGNANT NEOPLASM OF BREAST: ICD-10-CM

## 2024-12-18 PROCEDURE — 77063 BREAST TOMOSYNTHESIS BI: CPT

## 2024-12-18 PROCEDURE — 77067 SCR MAMMO BI INCL CAD: CPT | Performed by: RADIOLOGY

## 2024-12-18 PROCEDURE — 77063 BREAST TOMOSYNTHESIS BI: CPT | Performed by: RADIOLOGY

## 2025-01-14 ENCOUNTER — APPOINTMENT (OUTPATIENT)
Dept: PHYSICAL THERAPY | Facility: CLINIC | Age: 60
End: 2025-01-14
Payer: COMMERCIAL

## 2025-02-01 DIAGNOSIS — I10 HYPERTENSION, UNSPECIFIED TYPE: ICD-10-CM

## 2025-02-01 DIAGNOSIS — K21.9 CHRONIC GERD: ICD-10-CM

## 2025-02-03 RX ORDER — LOSARTAN POTASSIUM AND HYDROCHLOROTHIAZIDE 12.5; 5 MG/1; MG/1
1 TABLET ORAL DAILY
Qty: 90 TABLET | Refills: 3 | Status: SHIPPED | OUTPATIENT
Start: 2025-02-03 | End: 2026-02-03

## 2025-02-03 RX ORDER — OMEPRAZOLE 20 MG/1
20 CAPSULE, DELAYED RELEASE ORAL
Qty: 90 CAPSULE | Refills: 3 | Status: SHIPPED | OUTPATIENT
Start: 2025-02-03 | End: 2026-02-03

## 2025-03-04 NOTE — PROGRESS NOTES
Physical Therapy    Physical Therapy Evaluation and Treatment      Patient Name: Yudelka Haywood  MRN: [id number]  Today's Date: 3/5/2025    Time Entry:   Time Calculation  Start Time: 1347  Stop Time: 1425  Time Calculation (min): 38 min  PT Evaluation Time Entry  PT Evaluation (Low) Time Entry: 28  PT Therapeutic Procedures Time Entry  Therapeutic Exercise Time Entry: 10                 Insurance:  [employer]  20 V/Y  PA not req  Visit: 1  POC: 5      Assessment:  59 y/o F presents to outpatient physical therapy with reports of bilateral knee pain R>L d/t degenerative changes within the joint. The patient presents with the current impairments of pain, decreased ROM, weakness, and decreased mobility. These impairments currently limit their ability to stand, ambulate, negotiate stairs, and perform transfers. Due to the limitations listed above, the patient is currently at a decreased functional level compared to baseline, and they would benefit from skilled physical therapy to improve pain intensity, strength, flexibility, mobility, and facilitate a safe and efficient return to functional baseline. Patient's prognosis for improvement with therapy is good at this time.  PT Assessment  PT Assessment Results: Decreased strength, Decreased range of motion, Decreased mobility, Pain  Rehab Prognosis: Good  Evaluation/Treatment Tolerance: Patient tolerated treatment well     Plan:  OP PT Plan  Treatment/Interventions: Aquatic therapy, Cryotherapy, Education/ Instruction, Electrical stimulation, Hot pack, Manual therapy, Neuromuscular re-education, Self care/ home management, Taping techniques, Therapeutic activities, Therapeutic exercises, Vasopneumatic device  PT Plan: Skilled PT  PT Frequency: 1 time per week  Duration: 4 additional visits  Onset Date: 12/18/24  Certification Period Start Date: 03/05/25  Certification Period End Date: 06/03/25  Rehab Potential: Good  Plan of Care Agreement: Patient      Complexity:  Low    Current  Problem:   1. Bilateral primary osteoarthritis of knee  Referral to Physical Therapy    Follow Up In Physical Therapy          Subjective    Pt reports to OPPT with complaints of bilateral knee pain for the last 5 years or so. 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 Has had cortisone injections every 3-6 months over that time, and is now prepping for TKA in the near future. Recently had a series of three gel shots without much relief. She doesn't have a lot of pain at rest, but the R hurts worse than the L.     Pain intensity (right): 1/10 at this time, 7/10 at worst, 0/10 at best    Aggravating factors: walking, standing, sleeping, stairs, getting out of a chair    Relieving factors: ice    Patient goals: improve ROM, strength, and pain    General:  General  Reason for Referral: B knee pain  Referred By: Jelani Yao PA-C  Past Medical History Relevant to Rehab: HTN, OA  Precautions:  Precautions  STEADI Fall Risk Score (The score of 4 or more indicates an increased risk of falling): 0  Pain:  Pain Assessment  Pain Assessment: 0-10  0-10 (Numeric) Pain Score: 1  Pain Type: Chronic pain  Pain Location: Knee  Pain Orientation: Right, Left  Pain Descriptors: Aching, Sore  Home Living:  Home Living  Home Living Comment: Ranch home with a basement, 3 [place]  Prior Level of Function:  Prior Function Per Pt/Caregiver Report  Prior Function Comments: IND in all ADLs and desired activities    Objective   Knee AROM (*indicates pain):  Flexion R 106, L 125  Extension R +5, L +2    Knee PROM (*indicates pain):   Flexion R 111, L NT  Extension R +7, L +4    LE strength (*indicates pain):  Knee flexion R 4/5, L 4+/5  Knee extension R 4/5, L 4+/5  Hip flexion R 4/5*, L 4+/5  Hip abd R 4/5, L 4/5  Hip ext R 4/5, L 4/5    Dermatomes: intact    Gait: decreased stance time on RLE, lacks full knee extension at IC bilaterally    Palpation: no significant tenderness to palpation throughout the B knee joints    Functional  squat: NT    Sit to stand: with UE support, increased time to complete    Edema: mild/moderate edema noted in the R knee compared to the L       Outcome Measures:  Other Measures  Lower Extremity Funtional Score (LEFS): 48/80     Treatments:  Therapeutic exercises:   Heel slides with strap x10  Quad set x10  SLR with quad set x10  SL hip abduction x10  Seated heel prop x2'    EDUCATION:  Outpatient Education  Individual(s) Educated: Patient  Education Provided: Anatomy, Body Mechanics, Home Exercise Program, Physiology, POC  Risk and Benefits Discussed with Patient/Caregiver/Other: yes  Patient/Caregiver Demonstrated Understanding: yes  Plan of Care Discussed and Agreed Upon: yes  Patient Response to Education: Patient/Caregiver Verbalized Understanding of Information, Patient/Caregiver Performed Return Demonstration of Exercises/Activities, Patient/Caregiver Asked Appropriate Questions  3/5/25:   Exercises  - Supine Heel Slide with Strap  - 2 x daily - 7 x weekly - 1 sets - 10 reps - 3sec hold  - Seated Knee Extension Stretch with Chair  - 2 x daily - 7 x weekly - 1 sets - 1 reps - 2-5 min hold  - Supine Quad Set  - 1 x daily - 7 x weekly - 1 sets - 10 reps - 5sec hold  - Active Straight Leg Raise with Quad Set  - 1 x daily - 7 x weekly - 2 sets - 10 reps  - Sidelying Hip Abduction  - 1 x daily - 7 x weekly - 2 sets - 10 reps  Goals:  By discharge:  1. Patient will report and demonstrate independence with current HEP  2. Patient will demonstrate AROM of the B knee 0-120 to improve their ability to ambulate, negotiate stairs, and squat without restriction  3. Patient will demonstrate the ability to perform 15 sit to stand transfers from a normal chair height without upper extremity assistance to indicate improved functional strength of the lower extremities  4. Patient will demonstrate gross hip and knee strength of >/= 4+/5 to improve the ability to ambulate, squat, and lift without restrictions  5. Patient will  demonstrate an increase in Lower Extremity Functional Scale score by 8 points to 56/80 to meet established MCID (baseline 3/5/25 48/80)  6. Patient will demonstrate the ability to ambulate >/= 150' independently and without any major deviations in gait to indicate improved mobility within the home and the community   7. Patient will demonstrate the ability to ascend/descend one flight of stairs reciprocally and with good eccentric control of the knee to indicate improved functional mobility within the home and community

## 2025-03-05 ENCOUNTER — EVALUATION (OUTPATIENT)
Dept: PHYSICAL THERAPY | Facility: CLINIC | Age: 60
End: 2025-03-05
Payer: COMMERCIAL

## 2025-03-05 DIAGNOSIS — M17.0 BILATERAL PRIMARY OSTEOARTHRITIS OF KNEE: ICD-10-CM

## 2025-03-05 PROCEDURE — 97161 PT EVAL LOW COMPLEX 20 MIN: CPT | Mod: GP | Performed by: PHYSICAL THERAPIST

## 2025-03-05 PROCEDURE — 97110 THERAPEUTIC EXERCISES: CPT | Mod: GP | Performed by: PHYSICAL THERAPIST

## 2025-03-05 ASSESSMENT — PAIN DESCRIPTION - DESCRIPTORS: DESCRIPTORS: ACHING;SORE

## 2025-03-05 ASSESSMENT — PAIN SCALES - GENERAL: PAINLEVEL_OUTOF10: 1

## 2025-03-05 ASSESSMENT — PAIN - FUNCTIONAL ASSESSMENT: PAIN_FUNCTIONAL_ASSESSMENT: 0-10

## 2025-03-13 ENCOUNTER — TREATMENT (OUTPATIENT)
Dept: PHYSICAL THERAPY | Facility: CLINIC | Age: 60
End: 2025-03-13
Payer: COMMERCIAL

## 2025-03-13 DIAGNOSIS — M17.0 BILATERAL PRIMARY OSTEOARTHRITIS OF KNEE: Primary | ICD-10-CM

## 2025-03-13 PROCEDURE — 97110 THERAPEUTIC EXERCISES: CPT | Mod: GP,CQ

## 2025-03-13 ASSESSMENT — PAIN SCALES - GENERAL: PAINLEVEL_OUTOF10: 5 - MODERATE PAIN

## 2025-03-13 ASSESSMENT — PAIN - FUNCTIONAL ASSESSMENT: PAIN_FUNCTIONAL_ASSESSMENT: 0-10

## 2025-03-13 NOTE — PROGRESS NOTES
Physical Therapy    Physical Therapy Treatment    Patient Name: uYdelka Haywood  MRN: [id number]  Today's Date: 3/13/2025    Time Entry:   Time Calculation  Start Time: 0230  Stop Time: 0317  Time Calculation (min): 47 min     PT Therapeutic Procedures Time Entry  Therapeutic Exercise Time Entry: 44                 Insurance:  [employer]  20 V/Y  PA not req  Visit: 2  POC: 5    Assessment: Pelvis= after MET. Added additional HEP. KT donned on R knee to increase support. Pt horace added there ex w/o issue. Patient would benefit from P.T. to continue to address impairments in order to improve strength, flexibility, posture, and  to decrease symptoms and increase overall function.    PT Assessment  Evaluation/Treatment Tolerance: Patient tolerated treatment well    Plan: Assess KT. Increase HEP prn. To schedule TKR for June per pt. Pt's ins includes pre-op PT so going to do one more visit in 3-4 weeks to make sure she's still doing well.  OP PT Plan  PT Plan: Skilled PT    Current Problem  1. Bilateral primary osteoarthritis of knee  Follow Up In Physical Therapy          General     General  Reason for Referral: B knee pain  Referred By: Jelani Yao PA-C  Past Medical History Relevant to Rehab: HTN, OA    Subjective  Notes increased pain carol trying to go on a walk the other day. Notes some back and hip pain after going on a walk. Reports 5/10 R knee pain and 0/10 L knee pain.    Precautions  Precautions  STEADI Fall Risk Score (The score of 4 or more indicates an increased risk of falling): 0       Pain  Pain Assessment  Pain Assessment: 0-10  0-10 (Numeric) Pain Score: 5 - Moderate pain  Pain Type: Chronic pain  Pain Location: Knee  Pain Orientation: Right, Left    Objective   Knee AROM (*indicates pain):  Flexion R 106 (IE), 126 / L 125 (IE), 137  Extension R +5 (IE and same today)/ L +2 (IE and same today)    R long-short    Treatments:  Therapeutic exercises:   Recumb Bike (SH14.5) x6' L7  Heel slides x10  Quad  "set 5\"x10  L SLR with quad set x10  SL hip abduction x15  Seated heel prop x2' HEP  R HSB BTB x15  Hamstring Stretch 3x30\"  Clamshells x15 B  Includes MET to decrease rotation and KT to R knee two \"U\" strips to increase ant translation and \"I\" strip for patellar relief. Pt given written and verbal tape wearing instructions.        OP EDUCATION:   3/5/25:   Exercises  - Supine Heel Slide with Strap  - 2 x daily - 7 x weekly - 1 sets - 10 reps - 3sec hold  - Seated Knee Extension Stretch with Chair  - 2 x daily - 7 x weekly - 1 sets - 1 reps - 2-5 min hold  - Supine Quad Set  - 1 x daily - 7 x weekly - 1 sets - 10 reps - 5sec hold  - Active Straight Leg Raise with Quad Set  - 1 x daily - 7 x weekly - 2 sets - 10 reps  - Sidelying Hip Abduction  - 1 x daily - 7 x weekly - 2 sets - 10 reps    Access Code: [email or web address]  URL: [email or web address]  Date: 03/13/2025  - Hooklying Isometric Clamshell  - 1-2 x daily - 7 x weekly - 2 sets - 15 reps - 5 seconds hold  - Supine Hip Adduction Isometric with Ball  - 1-2 x daily - 7 x weekly - 2 sets - 15 reps - 5 seconds hold  - Seated Hamstring Curl with Anchored Resistance  BLKTB- 1-2 x daily - 7 x weekly - 2 sets - 15 reps  - Clamshell with Resistance  (no resistance first 1-2wks) BTB- 1-2 x daily - 7 x weekly - 2 sets - 15 reps  - Seated Table Hamstring Stretch  - 1-2 x daily - 7 x weekly - 3 reps - 30 seconds hold    Goals:  By discharge:  1. Patient will report and demonstrate independence with current HEP  2. Patient will demonstrate AROM of the B knee 0-120 to improve their ability to ambulate, negotiate stairs, and squat without restriction  3. Patient will demonstrate the ability to perform 15 sit to stand transfers from a normal chair height without upper extremity assistance to indicate improved functional strength of the lower extremities  4. Patient will demonstrate gross hip and knee strength of >/= 4+/5 to improve the ability to ambulate, squat, " and lift without restrictions  5. Patient will demonstrate an increase in Lower Extremity Functional Scale score by 8 points to 56/80 to meet established MCID (baseline 3/5/25 48/80)  6. Patient will demonstrate the ability to ambulate >/= 150' independently and without any major deviations in gait to indicate improved mobility within the home and the community   7. Patient will demonstrate the ability to ascend/descend one flight of stairs reciprocally and with good eccentric control of the knee to indicate improved functional mobility within the home and community

## 2025-03-19 ENCOUNTER — APPOINTMENT (OUTPATIENT)
Dept: PHYSICAL THERAPY | Facility: CLINIC | Age: 60
End: 2025-03-19
Payer: COMMERCIAL

## 2025-03-24 ENCOUNTER — APPOINTMENT (OUTPATIENT)
Dept: PRIMARY CARE | Facility: CLINIC | Age: 60
End: 2025-03-24
Payer: COMMERCIAL

## 2025-03-24 VITALS
SYSTOLIC BLOOD PRESSURE: 120 MMHG | DIASTOLIC BLOOD PRESSURE: 68 MMHG | HEART RATE: 88 BPM | HEIGHT: 66 IN | OXYGEN SATURATION: 98 % | WEIGHT: 208 LBS | RESPIRATION RATE: 14 BRPM | TEMPERATURE: 98.2 F | BODY MASS INDEX: 33.43 KG/M2

## 2025-03-24 DIAGNOSIS — I10 PRIMARY HYPERTENSION: ICD-10-CM

## 2025-03-24 DIAGNOSIS — M17.0 BILATERAL PRIMARY OSTEOARTHRITIS OF KNEE: ICD-10-CM

## 2025-03-24 DIAGNOSIS — R73.9 HYPERGLYCEMIA: Primary | ICD-10-CM

## 2025-03-24 DIAGNOSIS — Z23 ENCOUNTER FOR VACCINATION: ICD-10-CM

## 2025-03-24 LAB — POC HEMOGLOBIN A1C: 6.2 % (ref 4.2–6.5)

## 2025-03-24 PROCEDURE — 99214 OFFICE O/P EST MOD 30 MIN: CPT | Performed by: INTERNAL MEDICINE

## 2025-03-24 PROCEDURE — 90707 MMR VACCINE SC: CPT | Performed by: INTERNAL MEDICINE

## 2025-03-24 PROCEDURE — 90471 IMMUNIZATION ADMIN: CPT | Performed by: INTERNAL MEDICINE

## 2025-03-24 PROCEDURE — 3078F DIAST BP <80 MM HG: CPT | Performed by: INTERNAL MEDICINE

## 2025-03-24 PROCEDURE — 3074F SYST BP LT 130 MM HG: CPT | Performed by: INTERNAL MEDICINE

## 2025-03-24 PROCEDURE — 3008F BODY MASS INDEX DOCD: CPT | Performed by: INTERNAL MEDICINE

## 2025-03-24 PROCEDURE — 83036 HEMOGLOBIN GLYCOSYLATED A1C: CPT | Performed by: INTERNAL MEDICINE

## 2025-03-24 RX ORDER — NAPROXEN 500 MG/1
TABLET ORAL
COMMUNITY
Start: 2025-03-19

## 2025-03-24 RX ORDER — LORATADINE 10 MG/1
10 TABLET ORAL DAILY
COMMUNITY

## 2025-03-24 ASSESSMENT — PATIENT HEALTH QUESTIONNAIRE - PHQ9
2. FEELING DOWN, DEPRESSED OR HOPELESS: NOT AT ALL
SUM OF ALL RESPONSES TO PHQ9 QUESTIONS 1 AND 2: 0
1. LITTLE INTEREST OR PLEASURE IN DOING THINGS: NOT AT ALL

## 2025-03-24 NOTE — PROGRESS NOTES
"Subjective    Yudelka Haywood is a 60 y.o. female who presents for Follow-up.  HPI    6 month fu DM  Denies headaches, dizziness, sob, chest pain.   Has not been able to exercise. Needs knee replacement.     Has had some stress. Mom has had had health issues.   She is going to [place] and [place]      Review of Systems   All other systems reviewed and are negative.        Objective     /68 (BP Location: Left arm, Patient Position: Sitting, BP Cuff Size: Large adult)   Pulse 88   Temp 36.8 °C (98.2 °F) (Skin)   Resp 14   Ht 1.676 m (5' 6\")   Wt 94.3 kg (208 lb)   LMP 07/01/2011   SpO2 98%   BMI 33.57 kg/m²      Health Maintenance Due   Topic Date Due    HIV Screening  Never done    MMR Vaccines (1 of 1 - Standard series) Never done    Hepatitis C Screening  Never done    Cervical Cancer Screening  06/29/2020    Diabetes Screening  12/13/2024      A and o times 3  Cv: hrrr  Lctab  Abd. Positive bs soft nt.  Extremity no edema.  Skin warm and dry      Assessment/Plan   Problem List Items Addressed This Visit       Primary hypertension    Bilateral primary osteoarthritis of knee     Other Visit Diagnoses       Hyperglycemia    -  Primary    Relevant Orders    POCT glycosylated hemoglobin (Hb A1C) manually resulted (Completed)    Encounter for vaccination        Relevant Orders    MMR vaccine, subcutaneous (MMR II)        Her blood sugars are good.   Mmr vaccine given.   She is going  to travel and recommend travel clinic  Her blood pressure and blood sugars are good.   She has gained a few pounds with the stress of her mother being ill and unable to walk.   Watch diet.  Call  with any problems or questions.   Follow up in 6 months  "

## 2025-03-26 ENCOUNTER — APPOINTMENT (OUTPATIENT)
Dept: PRIMARY CARE | Facility: CLINIC | Age: 60
End: 2025-03-26
Payer: COMMERCIAL

## 2025-03-26 ENCOUNTER — APPOINTMENT (OUTPATIENT)
Dept: PHYSICAL THERAPY | Facility: CLINIC | Age: 60
End: 2025-03-26
Payer: COMMERCIAL

## 2025-03-28 ENCOUNTER — APPOINTMENT (OUTPATIENT)
Dept: PRIMARY CARE | Facility: CLINIC | Age: 60
End: 2025-03-28
Payer: COMMERCIAL

## 2025-04-09 ENCOUNTER — TREATMENT (OUTPATIENT)
Dept: PHYSICAL THERAPY | Facility: CLINIC | Age: 60
End: 2025-04-09
Payer: COMMERCIAL

## 2025-04-09 DIAGNOSIS — M17.0 BILATERAL PRIMARY OSTEOARTHRITIS OF KNEE: ICD-10-CM

## 2025-04-09 PROCEDURE — 97110 THERAPEUTIC EXERCISES: CPT | Mod: GP | Performed by: PHYSICAL THERAPIST

## 2025-04-09 ASSESSMENT — PAIN SCALES - GENERAL: PAINLEVEL_OUTOF10: 6

## 2025-04-09 ASSESSMENT — PAIN - FUNCTIONAL ASSESSMENT: PAIN_FUNCTIONAL_ASSESSMENT: 0-10

## 2025-04-09 NOTE — PROGRESS NOTES
Physical Therapy    Physical Therapy Discharge    Patient Name: Yudelka Haywood  MRN: [id number]  Today's Date: 4/9/2025    Time Entry:   Time Calculation  Start Time: 0933  Stop Time: 1017  Time Calculation (min): 44 min     PT Therapeutic Procedures Time Entry  Therapeutic Exercise Time Entry: 41                 Insurance:  [employer]  20 V/Y  PA not req  Visit: 3  POC: 5    Assessment:   Patient has completed 3 therapy visits up to this point, and have met 2/7 established therapy goals. The patient has progressed well, and has shown improvements in strength and ROM of the bilateral knees. At this time, the patient remains below functional baseline with pain in the bilateral knees R>L, decreased ROM in the bilateral knees, and decreased mobility. However, patient is only allocated 20 visits of PT a year. She is scheduled to get a TKA done in the next two months. As her ROM and strength are currently improving, PT recommends early discharge of case to preserve visits for post operative PT. PT anticipates additional progressions towards goals missed with adherence to HEP. Patient agreeable to plan.   PT Assessment  Assessment Comment: discharge to HEP    Plan:   OP PT Plan  PT Plan: No Additional PT interventions required at this time    Current Problem  1. Bilateral primary osteoarthritis of knee  Follow Up In Physical Therapy            General     General  Reason for Referral: B knee pain  Referred By: Jelani Yao PA-C  Past Medical History Relevant to Rehab: HTN, OA    Subjective    Pt reports that pain in the right knee is a 6/10 with walking. She has been working on the exercises approximately 1x/week.   Precautions  Precautions  STEADI Fall Risk Score (The score of 4 or more indicates an increased risk of falling): 0       Pain  Pain Assessment  Pain Assessment: 0-10  0-10 (Numeric) Pain Score: 6  Pain Type: Chronic pain  Pain Location: Knee  Pain Orientation: Right, Left    Objective   Knee AROM (*indicates  "pain):  Flexion R 124, L 126  Extension R +2, L +1    LE strength (*indicates pain):  Knee flexion R 4+/5, L 4+/5  Knee extension R 4+/5, L 4+/5  Hip flexion R 4+/5, L 4+/5  Hip abd R 4+/5, L 4+/5  Hip ext R 4+/5, L 4+/5    LEFS: 35/80    Gait: pt amb with decreased knee flexion on the right throughout stance phase    Stairs: pt reports not being able to perform stairs reciprocally at this time    Sit<>stand: pt is able to perform 15 sit<>transfers from chair without UE support      Treatments:  Therapeutic exercises:   Obj measures and goals review  HS stretch at step 2x30\" B  Calf stretch at slant board 2x30\"   Recumb Bike (SH14.5) x6' L7  Heel slides x10 B  Quad set 5\"x15 B  L SLR with quad set x10 NT  LAQ (eccentric focus) x10 B  HSB BTB x15 NT  Clamshells x15 B NT  Sit<>stand x15      Units: 3      OP EDUCATION:   3/5/25:   Exercises  - Supine Heel Slide with Strap  - 2 x daily - 7 x weekly - 1 sets - 10 reps - 3sec hold  - Seated Knee Extension Stretch with Chair  - 2 x daily - 7 x weekly - 1 sets - 1 reps - 2-5 min hold  - Supine Quad Set  - 1 x daily - 7 x weekly - 1 sets - 10 reps - 5sec hold  - Active Straight Leg Raise with Quad Set  - 1 x daily - 7 x weekly - 2 sets - 10 reps  - Sidelying Hip Abduction  - 1 x daily - 7 x weekly - 2 sets - 10 reps    Access Code: [email or web address]  URL: [email or web address]  Date: 03/13/2025  - Hooklying Isometric Clamshell  - 1-2 x daily - 7 x weekly - 2 sets - 15 reps - 5 seconds hold  - Supine Hip Adduction Isometric with Ball  - 1-2 x daily - 7 x weekly - 2 sets - 15 reps - 5 seconds hold  - Seated Hamstring Curl with Anchored Resistance  BLKTB- 1-2 x daily - 7 x weekly - 2 sets - 15 reps  - Clamshell with Resistance  (no resistance first 1-2wks) BTB- 1-2 x daily - 7 x weekly - 2 sets - 15 reps  - Seated Table Hamstring Stretch  - 1-2 x daily - 7 x weekly - 3 reps - 30 seconds hold    4/9/25:   Exercises  - Seated Long Arc Quad  - 1 x daily - 7 x weekly " - 2 sets - 10 reps  - Long Sitting Calf Stretch with Strap  - 2 x daily - 7 x weekly - 1 sets - 3 reps - 30 sec hold    Goals:  By discharge:  1. Patient will report and demonstrate independence with current HEP MET  2. Patient will demonstrate AROM of the B knee 0-120 to improve their ability to ambulate, negotiate stairs, and squat without restriction PARTIALLY MET  3. Patient will demonstrate the ability to perform 15 sit to stand transfers from a normal chair height without upper extremity assistance to indicate improved functional strength of the lower extremities PARTIALLY MET  4. Patient will demonstrate gross hip and knee strength of >/= 4+/5 to improve the ability to ambulate, squat, and lift without restrictions MET  5. Patient will demonstrate an increase in Lower Extremity Functional Scale score by 8 points to 56/80 to meet established MCID (baseline 3/5/25 48/80) NOT MET  6. Patient will demonstrate the ability to ambulate >/= 150' independently and without any major deviations in gait to indicate improved mobility within the home and the community  PROGRESSING  7. Patient will demonstrate the ability to ascend/descend one flight of stairs reciprocally and with good eccentric control of the knee to indicate improved functional mobility within the home and community NOT MET

## 2025-05-30 LAB
NON-UH HIE APPEARANCE, U: NORMAL
NON-UH HIE BILIRUBIN, U: NORMAL
NON-UH HIE BLOOD, U: NORMAL
NON-UH HIE BUN/CREAT RATIO: 18.6
NON-UH HIE BUN: 13 MG/DL (ref 9–23)
NON-UH HIE CALCIUM: 9.9 MG/DL (ref 8.7–10.4)
NON-UH HIE CALCULATED OSMOLALITY: 279 MOSM/KG (ref 275–295)
NON-UH HIE CHLORIDE: 102 MMOL/L (ref 98–107)
NON-UH HIE CO2, VENOUS: 28 MMOL/L (ref 20–31)
NON-UH HIE COLOR, U: NORMAL
NON-UH HIE CREATININE: 0.7 MG/DL (ref 0.5–0.8)
NON-UH HIE GFR AA: >60
NON-UH HIE GLOMERULAR FILTRATION RATE: >60 ML/MIN/1.73M?
NON-UH HIE GLUCOSE QUAL, U: NORMAL
NON-UH HIE GLUCOSE: 81 MG/DL (ref 74–106)
NON-UH HIE HCT: 41.3 % (ref 36–46)
NON-UH HIE HGB: 13.7 G/DL (ref 12–16)
NON-UH HIE INSTR WBC ND: 8.1
NON-UH HIE K: 3.7 MMOL/L (ref 3.5–5.1)
NON-UH HIE KETONES, U: NORMAL
NON-UH HIE LEUKOCYTE ESTERASE, U: NORMAL
NON-UH HIE MCH: 28.5 PG (ref 27–34)
NON-UH HIE MCHC: 33.1 G/DL (ref 32–37)
NON-UH HIE MCV: 86.2 FL (ref 80–100)
NON-UH HIE MPV: 8.9 FL (ref 7.4–10.4)
NON-UH HIE NA: 140 MMOL/L (ref 135–145)
NON-UH HIE NITRITE, U: NORMAL
NON-UH HIE PH, U: 5.5 (ref 4.5–8)
NON-UH HIE PLATELET: 330 X10 (ref 150–450)
NON-UH HIE PROTEIN, U: NORMAL
NON-UH HIE RBC: 4.79 X10 (ref 4.2–5.4)
NON-UH HIE RDW: 14.2 % (ref 11.5–14.5)
NON-UH HIE SPECIFIC GRAVITY, U: 1.01 (ref 1–1.03)
NON-UH HIE U MICRO: NORMAL
NON-UH HIE UROBILINOGEN QUAL, U: NORMAL
NON-UH HIE WBC: 8.1 X10 (ref 4.5–11)

## 2025-09-03 ENCOUNTER — APPOINTMENT (OUTPATIENT)
Dept: PRIMARY CARE | Facility: CLINIC | Age: 60
End: 2025-09-03
Payer: COMMERCIAL

## 2025-12-23 ENCOUNTER — APPOINTMENT (OUTPATIENT)
Dept: PRIMARY CARE | Facility: CLINIC | Age: 60
End: 2025-12-23
Payer: COMMERCIAL